# Patient Record
Sex: FEMALE | Race: WHITE | Employment: FULL TIME | URBAN - METROPOLITAN AREA
[De-identification: names, ages, dates, MRNs, and addresses within clinical notes are randomized per-mention and may not be internally consistent; named-entity substitution may affect disease eponyms.]

---

## 2023-10-31 ENCOUNTER — HOSPITAL ENCOUNTER (OUTPATIENT)
Facility: HOSPITAL | Age: 33
Discharge: ANOTHER HOSPITAL | End: 2023-11-01
Attending: OBSTETRICS & GYNECOLOGY | Admitting: OBSTETRICS & GYNECOLOGY
Payer: COMMERCIAL

## 2023-10-31 RX ORDER — CHOLECALCIFEROL (VITAMIN D3) 25 MCG
1 TABLET,CHEWABLE ORAL DAILY
COMMUNITY

## 2023-11-01 ENCOUNTER — HOSPITAL ENCOUNTER (EMERGENCY)
Facility: HOSPITAL | Age: 33
Discharge: LEFT BEFORE DISCHARGE | End: 2023-11-01
Payer: COMMERCIAL

## 2023-11-01 VITALS
TEMPERATURE: 98 F | DIASTOLIC BLOOD PRESSURE: 83 MMHG | HEART RATE: 111 BPM | SYSTOLIC BLOOD PRESSURE: 135 MMHG | OXYGEN SATURATION: 100 % | RESPIRATION RATE: 20 BRPM

## 2023-11-01 VITALS
WEIGHT: 193 LBS | OXYGEN SATURATION: 97 % | DIASTOLIC BLOOD PRESSURE: 75 MMHG | HEART RATE: 88 BPM | SYSTOLIC BLOOD PRESSURE: 128 MMHG | TEMPERATURE: 98 F

## 2023-11-01 LAB
BILIRUBIN URINE: NEGATIVE
CONTROL RUN WITHIN 24 HOURS?: YES
GLUCOSE URINE: NEGATIVE
KETONE URINE: NEGATIVE
NITRITE URINE: NEGATIVE
PH URINE: 6.5 (ref 5–8)
PROTEIN URINE: 30
SPEC GRAVITY: 1.02 (ref 1–1.03)
URINE COLOR: YELLOW
UROBILINOGEN URINE: 0.2

## 2023-11-01 PROCEDURE — 59025 FETAL NON-STRESS TEST: CPT | Performed by: OBSTETRICS & GYNECOLOGY

## 2023-11-01 NOTE — NST
Nonstress Test   Patient: Aminata Melendez    Gestation: 26w4d    NST:       Variability: Moderate           Accelerations: No           Decelerations: None            Baseline: 150 BPM           Uterine Irritability: Yes           Contractions: Not present                                                    Nonstress Test Interpretation: Appropriate for gestational age                                 Additional Comments

## 2023-11-01 NOTE — PROGRESS NOTES
34year old  at 26+4 weeks gestation presents to triage with c/o left groin pain and occasional back pain since this morning. [place] [facility] [place] leaking of fluid or vaginal bleeding and reports good fetal movement. On further questioning pt states that back discomfort is more left flank pain. Plan of care for triage discussed with patient.

## 2023-11-01 NOTE — PROGRESS NOTES
Pt with large amount of emesis on floor and in bathroom. Pt states that she also vomited at home x 1 time today. Pt denies feeling contractions.

## 2023-11-01 NOTE — NST
Nonstress Test   Patient: Nahomy Tony    Gestation: 26w4d    NST:       Variability: Moderate           Accelerations: No           Decelerations: None            Baseline: 150 BPM           Uterine Irritability: Yes           Contractions: Not present                                        Acoustic Stimulator: No           Nonstress Test Interpretation: Appropriate for gestational age                                 Additional Comments

## 2023-11-01 NOTE — ED INITIAL ASSESSMENT (HPI)
Per pt, started having left sided back pain 1 hours PTA now coming to front. Pt is 25 wks. Cleared by L&D .+nausea/vomiting. Denies any diarrhea or constipation.

## 2023-11-10 PROBLEM — O60.03 PRETERM LABOR IN THIRD TRIMESTER WITHOUT DELIVERY: Status: ACTIVE | Noted: 2023-11-10
